# Patient Record
Sex: FEMALE | Race: ASIAN | Employment: UNEMPLOYED | URBAN - METROPOLITAN AREA
[De-identification: names, ages, dates, MRNs, and addresses within clinical notes are randomized per-mention and may not be internally consistent; named-entity substitution may affect disease eponyms.]

---

## 2017-01-10 ENCOUNTER — ROUTINE PRENATAL (OUTPATIENT)
Dept: OBGYN CLINIC | Age: 32
End: 2017-01-10

## 2017-01-10 VITALS
HEART RATE: 80 BPM | BODY MASS INDEX: 19.02 KG/M2 | WEIGHT: 104 LBS | SYSTOLIC BLOOD PRESSURE: 110 MMHG | DIASTOLIC BLOOD PRESSURE: 65 MMHG

## 2017-01-10 DIAGNOSIS — Z34.82 PRENATAL CARE, SUBSEQUENT PREGNANCY, SECOND TRIMESTER: Primary | ICD-10-CM

## 2017-01-10 PROCEDURE — 99212 OFFICE O/P EST SF 10 MIN: CPT | Performed by: OBSTETRICS & GYNECOLOGY

## 2017-02-07 ENCOUNTER — ROUTINE PRENATAL (OUTPATIENT)
Dept: OBGYN CLINIC | Age: 32
End: 2017-02-07

## 2017-02-07 VITALS
BODY MASS INDEX: 20.85 KG/M2 | DIASTOLIC BLOOD PRESSURE: 66 MMHG | HEART RATE: 82 BPM | WEIGHT: 114 LBS | SYSTOLIC BLOOD PRESSURE: 110 MMHG

## 2017-02-07 DIAGNOSIS — Z34.83 PRENATAL CARE, SUBSEQUENT PREGNANCY, THIRD TRIMESTER: Primary | ICD-10-CM

## 2017-02-07 PROCEDURE — 99212 OFFICE O/P EST SF 10 MIN: CPT | Performed by: OBSTETRICS & GYNECOLOGY

## 2017-03-01 ENCOUNTER — HOSPITAL ENCOUNTER (OUTPATIENT)
Dept: OTHER | Age: 32
Discharge: HOME | End: 2017-03-31

## 2017-03-07 ENCOUNTER — ROUTINE PRENATAL (OUTPATIENT)
Dept: OBGYN CLINIC | Age: 32
End: 2017-03-07

## 2017-03-07 VITALS
WEIGHT: 121 LBS | SYSTOLIC BLOOD PRESSURE: 116 MMHG | HEART RATE: 100 BPM | BODY MASS INDEX: 22.13 KG/M2 | DIASTOLIC BLOOD PRESSURE: 67 MMHG

## 2017-03-07 DIAGNOSIS — Z3A.32 32 WEEKS GESTATION OF PREGNANCY: Primary | ICD-10-CM

## 2017-03-07 PROCEDURE — 99213 OFFICE O/P EST LOW 20 MIN: CPT | Performed by: OBSTETRICS & GYNECOLOGY

## 2017-04-01 ENCOUNTER — HOSPITAL ENCOUNTER (OUTPATIENT)
Dept: OTHER | Age: 32
Discharge: HOME | End: 2017-04-30

## 2017-04-04 ENCOUNTER — ROUTINE PRENATAL (OUTPATIENT)
Dept: OBGYN CLINIC | Age: 32
End: 2017-04-04

## 2017-04-04 VITALS
BODY MASS INDEX: 22.68 KG/M2 | SYSTOLIC BLOOD PRESSURE: 108 MMHG | HEART RATE: 80 BPM | WEIGHT: 124 LBS | DIASTOLIC BLOOD PRESSURE: 74 MMHG

## 2017-04-04 DIAGNOSIS — Z34.83 PRENATAL CARE, SUBSEQUENT PREGNANCY, THIRD TRIMESTER: Primary | ICD-10-CM

## 2017-04-04 LAB
BASOPHILS ABSOLUTE: 0 K/UL (ref 0–0.2)
BASOPHILS RELATIVE PERCENT: 0.2 %
EOSINOPHILS ABSOLUTE: 0.2 K/UL (ref 0–0.6)
EOSINOPHILS RELATIVE PERCENT: 2.3 %
HCT VFR BLD CALC: 42.4 % (ref 36–48)
HEMOGLOBIN: 14 G/DL (ref 12–16)
LYMPHOCYTES ABSOLUTE: 1.7 K/UL (ref 1–5.1)
LYMPHOCYTES RELATIVE PERCENT: 18.1 %
MCH RBC QN AUTO: 31.2 PG (ref 26–34)
MCHC RBC AUTO-ENTMCNC: 33 G/DL (ref 31–36)
MCV RBC AUTO: 94.6 FL (ref 80–100)
MONOCYTES ABSOLUTE: 0.7 K/UL (ref 0–1.3)
MONOCYTES RELATIVE PERCENT: 7 %
NEUTROPHILS ABSOLUTE: 6.8 K/UL (ref 1.7–7.7)
NEUTROPHILS RELATIVE PERCENT: 72.4 %
PDW BLD-RTO: 14.3 % (ref 12.4–15.4)
PLATELET # BLD: 234 K/UL (ref 135–450)
PMV BLD AUTO: 7.5 FL (ref 5–10.5)
RBC # BLD: 4.48 M/UL (ref 4–5.2)
WBC # BLD: 9.4 K/UL (ref 4–11)

## 2017-04-04 PROCEDURE — 99212 OFFICE O/P EST SF 10 MIN: CPT

## 2017-04-07 LAB — GROUP B STREP CULTURE: NORMAL

## 2017-04-11 ENCOUNTER — ROUTINE PRENATAL (OUTPATIENT)
Dept: OBGYN CLINIC | Age: 32
End: 2017-04-11

## 2017-04-11 VITALS
BODY MASS INDEX: 23.05 KG/M2 | HEART RATE: 97 BPM | DIASTOLIC BLOOD PRESSURE: 73 MMHG | WEIGHT: 126 LBS | SYSTOLIC BLOOD PRESSURE: 123 MMHG

## 2017-04-11 DIAGNOSIS — Z34.83 PRENATAL CARE, SUBSEQUENT PREGNANCY, THIRD TRIMESTER: Primary | ICD-10-CM

## 2017-04-11 PROCEDURE — 99212 OFFICE O/P EST SF 10 MIN: CPT | Performed by: OBSTETRICS & GYNECOLOGY

## 2017-04-18 ENCOUNTER — ROUTINE PRENATAL (OUTPATIENT)
Dept: OBGYN CLINIC | Age: 32
End: 2017-04-18

## 2017-04-18 VITALS
BODY MASS INDEX: 23.41 KG/M2 | HEART RATE: 98 BPM | DIASTOLIC BLOOD PRESSURE: 69 MMHG | WEIGHT: 128 LBS | SYSTOLIC BLOOD PRESSURE: 107 MMHG

## 2017-04-18 DIAGNOSIS — Z34.82 PRENATAL CARE, SUBSEQUENT PREGNANCY, SECOND TRIMESTER: Primary | ICD-10-CM

## 2017-04-18 PROCEDURE — 99213 OFFICE O/P EST LOW 20 MIN: CPT | Performed by: OBSTETRICS & GYNECOLOGY

## 2017-04-22 PROBLEM — Z34.90 TERM PREGNANCY: Status: ACTIVE | Noted: 2017-04-22

## 2017-04-27 ENCOUNTER — TELEPHONE (OUTPATIENT)
Dept: OBGYN CLINIC | Age: 32
End: 2017-04-27

## 2017-05-30 ENCOUNTER — POSTPARTUM VISIT (OUTPATIENT)
Dept: OBGYN CLINIC | Age: 32
End: 2017-05-30

## 2017-05-30 VITALS
SYSTOLIC BLOOD PRESSURE: 113 MMHG | TEMPERATURE: 97.4 F | DIASTOLIC BLOOD PRESSURE: 74 MMHG | BODY MASS INDEX: 19.2 KG/M2 | HEART RATE: 50 BPM | WEIGHT: 105 LBS

## 2019-04-08 LAB — GONADOTROPIN, CHORIONIC (HCG) QUANT: 1831 MIU/ML

## 2019-04-09 LAB
ABO/RH: NORMAL
ANTIBODY SCREEN: NORMAL

## 2019-04-10 LAB — URINE CULTURE, ROUTINE: NORMAL

## 2019-04-11 LAB
HPV COMMENT: NORMAL
HPV TYPE 16: NOT DETECTED
HPV TYPE 18: NOT DETECTED
HPVOH (OTHER TYPES): NOT DETECTED

## 2019-04-17 LAB
ESTIMATED AVERAGE GLUCOSE: 102.5 MG/DL
HBA1C MFR BLD: 5.2 %
PROLACTIN: 11.5 NG/ML
TSH SERPL DL<=0.05 MIU/L-ACNC: 0.74 UIU/ML (ref 0.27–4.2)

## 2019-04-20 LAB
ANTICARDIOLIPIN IGG ANTIBODY: 0 GPL (ref 0–14)
CARDIOLIPIN AB IGM: 5 MPL (ref 0–12)
DRVVT CONFIRMATION TEST: ABNORMAL RATIO
DRVVT SCREEN: 32 SEC (ref 33–44)
DRVVT,DIL: ABNORMAL SEC (ref 33–44)
HEXAGONAL PHOSPHOLIPID NEUTRALIZAT TEST: ABNORMAL
LUPUS ANTICOAG INTERP: ABNORMAL
PLT NEUTA: ABNORMAL
PT D: 12.3 SEC (ref 12–15.5)
PTT D: 46 SEC (ref 32–48)
PTT-D CORR REFLEX: ABNORMAL SEC (ref 32–48)
PTT-HEPARIN NEUTRALIZED: ABNORMAL SEC (ref 32–48)
REPTILASE TIME: ABNORMAL SEC
THROMBIN TIME: ABNORMAL SEC (ref 14.7–19.5)

## 2019-05-17 LAB
ORGANISM: ABNORMAL
URINE CULTURE, ROUTINE: ABNORMAL
URINE CULTURE, ROUTINE: ABNORMAL

## 2021-08-24 LAB
ABO/RH: NORMAL
ANTIBODY SCREEN: NORMAL
HEPATITIS C ANTIBODY INTERPRETATION: NORMAL

## 2021-08-25 LAB
BASOPHILS ABSOLUTE: 0 K/UL (ref 0–0.2)
BASOPHILS RELATIVE PERCENT: 0.6 %
EOSINOPHILS ABSOLUTE: 0.3 K/UL (ref 0–0.6)
EOSINOPHILS RELATIVE PERCENT: 3 %
HCT VFR BLD CALC: 41.5 % (ref 36–48)
HEMOGLOBIN: 14 G/DL (ref 12–16)
HEPATITIS B SURFACE ANTIGEN INTERPRETATION: NORMAL
HIV AG/AB: NORMAL
HIV ANTIGEN: NORMAL
HIV-1 ANTIBODY: NORMAL
HIV-2 AB: NORMAL
LYMPHOCYTES ABSOLUTE: 1.5 K/UL (ref 1–5.1)
LYMPHOCYTES RELATIVE PERCENT: 17.1 %
MCH RBC QN AUTO: 30 PG (ref 26–34)
MCHC RBC AUTO-ENTMCNC: 33.6 G/DL (ref 31–36)
MCV RBC AUTO: 89.4 FL (ref 80–100)
MONOCYTES ABSOLUTE: 0.4 K/UL (ref 0–1.3)
MONOCYTES RELATIVE PERCENT: 5.2 %
NEUTROPHILS ABSOLUTE: 6.4 K/UL (ref 1.7–7.7)
NEUTROPHILS RELATIVE PERCENT: 74.1 %
PDW BLD-RTO: 13.4 % (ref 12.4–15.4)
PLATELET # BLD: 246 K/UL (ref 135–450)
PMV BLD AUTO: 7.8 FL (ref 5–10.5)
RBC # BLD: 4.64 M/UL (ref 4–5.2)
RUBELLA ANTIBODY IGG: 39.6 IU/ML
TOTAL SYPHILLIS IGG/IGM: NORMAL
URINE CULTURE, ROUTINE: NORMAL
WBC # BLD: 8.7 K/UL (ref 4–11)

## 2021-12-14 LAB
GLUCOSE CHALLENGE: 65 MG/DL
HCT VFR BLD CALC: 36.3 % (ref 36–48)
HEMOGLOBIN: 12.1 G/DL (ref 12–16)
MCH RBC QN AUTO: 29.9 PG (ref 26–34)
MCHC RBC AUTO-ENTMCNC: 33.4 G/DL (ref 31–36)
MCV RBC AUTO: 89.6 FL (ref 80–100)
PDW BLD-RTO: 13.5 % (ref 12.4–15.4)
PLATELET # BLD: 616 K/UL (ref 135–450)
PMV BLD AUTO: 6.4 FL (ref 5–10.5)
RBC # BLD: 4.05 M/UL (ref 4–5.2)
WBC # BLD: 9.9 K/UL (ref 4–11)

## 2022-01-10 LAB
HCT VFR BLD CALC: 36.7 % (ref 34–49)
HEMOGLOBIN: 12.1 G/DL (ref 11.2–15.7)
MCH RBC QN AUTO: 28.7 PG (ref 26–34)
MCHC RBC AUTO-ENTMCNC: 33 G/DL (ref 30.7–35.5)
MCV RBC AUTO: 87 FL (ref 80–100)
PDW BLD-RTO: 12.2 %
PLATELET # BLD: 288 K/UL (ref 140–400)
PMV BLD AUTO: 9.2 FL (ref 7.2–11.7)
RBC # BLD: 4.22 M/UL (ref 3.95–5.26)
WBC: 9.7 K/UL (ref 3.5–10.9)

## 2022-02-28 LAB — GBS, EXTERNAL RESULT: NORMAL

## 2022-03-02 LAB
GROUP B STREP SCREEN PCR: NOT DETECTED
SOURCE: NORMAL

## 2022-03-23 ENCOUNTER — HOSPITAL ENCOUNTER (INPATIENT)
Age: 37
LOS: 1 days | Discharge: HOME | DRG: 560 | End: 2022-03-24
Attending: OBSTETRICS & GYNECOLOGY | Admitting: OBSTETRICS & GYNECOLOGY
Payer: MEDICAID

## 2022-03-23 PROBLEM — Z37.9 NORMAL LABOR: Status: ACTIVE | Noted: 2022-03-23

## 2022-03-23 LAB
ABO/RH: NORMAL
ABO/RH: NORMAL
AMPHETAMINE SCREEN, URINE: NORMAL
ANTIBODY SCREEN: NORMAL
BARBITURATE SCREEN URINE: NORMAL
BENZODIAZEPINE SCREEN, URINE: NORMAL
BUPRENORPHINE URINE: NORMAL
CANNABINOID SCREEN URINE: NORMAL
COCAINE METABOLITE SCREEN URINE: NORMAL
HCT VFR BLD CALC: 38.9 % (ref 36–48)
HEMOGLOBIN: 12.3 G/DL (ref 12–16)
Lab: NORMAL
MCH RBC QN AUTO: 27.1 PG (ref 26–34)
MCHC RBC AUTO-ENTMCNC: 31.7 G/DL (ref 31–36)
MCV RBC AUTO: 85.4 FL (ref 80–100)
METHADONE SCREEN, URINE: NORMAL
OPIATE SCREEN URINE: NORMAL
OXYCODONE URINE: NORMAL
PDW BLD-RTO: 14.7 % (ref 12.4–15.4)
PH UA: 5
PHENCYCLIDINE SCREEN URINE: NORMAL
PLATELET # BLD: 258 K/UL (ref 135–450)
PMV BLD AUTO: 7.1 FL (ref 5–10.5)
PROPOXYPHENE SCREEN: NORMAL
RBC # BLD: 4.55 M/UL (ref 4–5.2)
SARS-COV-2, NAAT: NOT DETECTED
WBC # BLD: 7.9 K/UL (ref 4–11)

## 2022-03-23 PROCEDURE — 86850 RBC ANTIBODY SCREEN: CPT

## 2022-03-23 PROCEDURE — 80307 DRUG TEST PRSMV CHEM ANLYZR: CPT

## 2022-03-23 PROCEDURE — 85027 COMPLETE CBC AUTOMATED: CPT

## 2022-03-23 PROCEDURE — 86780 TREPONEMA PALLIDUM: CPT

## 2022-03-23 PROCEDURE — 36415 COLL VENOUS BLD VENIPUNCTURE: CPT

## 2022-03-23 PROCEDURE — 87635 SARS-COV-2 COVID-19 AMP PRB: CPT

## 2022-03-23 PROCEDURE — 86901 BLOOD TYPING SEROLOGIC RH(D): CPT

## 2022-03-23 PROCEDURE — 6360000002 HC RX W HCPCS: Performed by: OBSTETRICS & GYNECOLOGY

## 2022-03-23 PROCEDURE — 7200000001 HC VAGINAL DELIVERY

## 2022-03-23 PROCEDURE — 10907ZC DRAINAGE OF AMNIOTIC FLUID, THERAPEUTIC FROM PRODUCTS OF CONCEPTION, VIA NATURAL OR ARTIFICIAL OPENING: ICD-10-PCS | Performed by: OBSTETRICS & GYNECOLOGY

## 2022-03-23 PROCEDURE — 0KQM0ZZ REPAIR PERINEUM MUSCLE, OPEN APPROACH: ICD-10-PCS | Performed by: OBSTETRICS & GYNECOLOGY

## 2022-03-23 PROCEDURE — 1220000000 HC SEMI PRIVATE OB R&B

## 2022-03-23 PROCEDURE — 2580000003 HC RX 258: Performed by: OBSTETRICS & GYNECOLOGY

## 2022-03-23 PROCEDURE — 6370000000 HC RX 637 (ALT 250 FOR IP): Performed by: OBSTETRICS & GYNECOLOGY

## 2022-03-23 PROCEDURE — 86900 BLOOD TYPING SEROLOGIC ABO: CPT

## 2022-03-23 RX ORDER — METHYLERGONOVINE MALEATE 0.2 MG/ML
200 INJECTION INTRAVENOUS PRN
Status: DISCONTINUED | OUTPATIENT
Start: 2022-03-23 | End: 2022-03-23

## 2022-03-23 RX ORDER — MODIFIED LANOLIN
OINTMENT (GRAM) TOPICAL PRN
Status: DISCONTINUED | OUTPATIENT
Start: 2022-03-23 | End: 2022-03-24 | Stop reason: HOSPADM

## 2022-03-23 RX ORDER — SIMETHICONE 80 MG
80 TABLET,CHEWABLE ORAL EVERY 6 HOURS PRN
Status: DISCONTINUED | OUTPATIENT
Start: 2022-03-23 | End: 2022-03-24 | Stop reason: HOSPADM

## 2022-03-23 RX ORDER — FERROUS SULFATE 325(65) MG
325 TABLET ORAL 2 TIMES DAILY WITH MEALS
Status: DISCONTINUED | OUTPATIENT
Start: 2022-03-23 | End: 2022-03-24 | Stop reason: HOSPADM

## 2022-03-23 RX ORDER — ACETAMINOPHEN 325 MG/1
650 TABLET ORAL EVERY 4 HOURS PRN
Status: DISCONTINUED | OUTPATIENT
Start: 2022-03-23 | End: 2022-03-23

## 2022-03-23 RX ORDER — OXYCODONE HYDROCHLORIDE 5 MG/1
5 TABLET ORAL EVERY 6 HOURS PRN
Status: DISCONTINUED | OUTPATIENT
Start: 2022-03-23 | End: 2022-03-24 | Stop reason: HOSPADM

## 2022-03-23 RX ORDER — SODIUM CHLORIDE 9 MG/ML
25 INJECTION, SOLUTION INTRAVENOUS PRN
Status: DISCONTINUED | OUTPATIENT
Start: 2022-03-23 | End: 2022-03-23

## 2022-03-23 RX ORDER — IBUPROFEN 800 MG/1
800 TABLET ORAL EVERY 6 HOURS PRN
Status: DISCONTINUED | OUTPATIENT
Start: 2022-03-23 | End: 2022-03-24 | Stop reason: HOSPADM

## 2022-03-23 RX ORDER — SODIUM CHLORIDE 9 MG/ML
25 INJECTION, SOLUTION INTRAVENOUS PRN
Status: DISCONTINUED | OUTPATIENT
Start: 2022-03-23 | End: 2022-03-24 | Stop reason: HOSPADM

## 2022-03-23 RX ORDER — SODIUM CHLORIDE, SODIUM LACTATE, POTASSIUM CHLORIDE, CALCIUM CHLORIDE 600; 310; 30; 20 MG/100ML; MG/100ML; MG/100ML; MG/100ML
1000 INJECTION, SOLUTION INTRAVENOUS PRN
Status: DISCONTINUED | OUTPATIENT
Start: 2022-03-23 | End: 2022-03-23

## 2022-03-23 RX ORDER — MISOPROSTOL 100 UG/1
800 TABLET ORAL PRN
Status: DISCONTINUED | OUTPATIENT
Start: 2022-03-23 | End: 2022-03-23

## 2022-03-23 RX ORDER — SODIUM CHLORIDE 0.9 % (FLUSH) 0.9 %
10 SYRINGE (ML) INJECTION EVERY 12 HOURS SCHEDULED
Status: DISCONTINUED | OUTPATIENT
Start: 2022-03-23 | End: 2022-03-24 | Stop reason: HOSPADM

## 2022-03-23 RX ORDER — CARBOPROST TROMETHAMINE 250 UG/ML
250 INJECTION, SOLUTION INTRAMUSCULAR PRN
Status: DISCONTINUED | OUTPATIENT
Start: 2022-03-23 | End: 2022-03-23

## 2022-03-23 RX ORDER — ONDANSETRON 2 MG/ML
4 INJECTION INTRAMUSCULAR; INTRAVENOUS EVERY 6 HOURS PRN
Status: DISCONTINUED | OUTPATIENT
Start: 2022-03-23 | End: 2022-03-24 | Stop reason: HOSPADM

## 2022-03-23 RX ORDER — SODIUM CHLORIDE 0.9 % (FLUSH) 0.9 %
10 SYRINGE (ML) INJECTION PRN
Status: DISCONTINUED | OUTPATIENT
Start: 2022-03-23 | End: 2022-03-24 | Stop reason: HOSPADM

## 2022-03-23 RX ORDER — DOCUSATE SODIUM 100 MG/1
100 CAPSULE, LIQUID FILLED ORAL 2 TIMES DAILY
Status: DISCONTINUED | OUTPATIENT
Start: 2022-03-23 | End: 2022-03-24 | Stop reason: HOSPADM

## 2022-03-23 RX ORDER — SODIUM CHLORIDE, SODIUM LACTATE, POTASSIUM CHLORIDE, CALCIUM CHLORIDE 600; 310; 30; 20 MG/100ML; MG/100ML; MG/100ML; MG/100ML
500 INJECTION, SOLUTION INTRAVENOUS PRN
Status: DISCONTINUED | OUTPATIENT
Start: 2022-03-23 | End: 2022-03-23

## 2022-03-23 RX ORDER — ACETAMINOPHEN 500 MG
500 TABLET ORAL EVERY 6 HOURS PRN
Qty: 120 TABLET | Refills: 0 | Status: SHIPPED | OUTPATIENT
Start: 2022-03-23 | End: 2022-03-24 | Stop reason: SDUPTHER

## 2022-03-23 RX ORDER — ONDANSETRON 2 MG/ML
4 INJECTION INTRAMUSCULAR; INTRAVENOUS EVERY 6 HOURS PRN
Status: DISCONTINUED | OUTPATIENT
Start: 2022-03-23 | End: 2022-03-23

## 2022-03-23 RX ORDER — IBUPROFEN 600 MG/1
600 TABLET ORAL EVERY 6 HOURS PRN
Qty: 120 TABLET | Refills: 0 | Status: SHIPPED | OUTPATIENT
Start: 2022-03-23 | End: 2022-03-24 | Stop reason: SDUPTHER

## 2022-03-23 RX ORDER — LIDOCAINE HYDROCHLORIDE 10 MG/ML
INJECTION, SOLUTION EPIDURAL; INFILTRATION; INTRACAUDAL; PERINEURAL
Status: DISCONTINUED
Start: 2022-03-23 | End: 2022-03-23

## 2022-03-23 RX ORDER — SODIUM CHLORIDE 0.9 % (FLUSH) 0.9 %
5-40 SYRINGE (ML) INJECTION PRN
Status: DISCONTINUED | OUTPATIENT
Start: 2022-03-23 | End: 2022-03-23

## 2022-03-23 RX ORDER — SODIUM CHLORIDE, SODIUM LACTATE, POTASSIUM CHLORIDE, CALCIUM CHLORIDE 600; 310; 30; 20 MG/100ML; MG/100ML; MG/100ML; MG/100ML
INJECTION, SOLUTION INTRAVENOUS CONTINUOUS
Status: DISCONTINUED | OUTPATIENT
Start: 2022-03-23 | End: 2022-03-23

## 2022-03-23 RX ORDER — ACETAMINOPHEN 325 MG/1
650 TABLET ORAL EVERY 4 HOURS PRN
Status: DISCONTINUED | OUTPATIENT
Start: 2022-03-23 | End: 2022-03-24 | Stop reason: HOSPADM

## 2022-03-23 RX ADMIN — SODIUM CHLORIDE, POTASSIUM CHLORIDE, SODIUM LACTATE AND CALCIUM CHLORIDE: 600; 310; 30; 20 INJECTION, SOLUTION INTRAVENOUS at 16:00

## 2022-03-23 RX ADMIN — DOCUSATE SODIUM 100 MG: 100 CAPSULE, LIQUID FILLED ORAL at 19:33

## 2022-03-23 RX ADMIN — Medication 87.3 MILLI-UNITS/MIN: at 18:04

## 2022-03-23 RX ADMIN — IBUPROFEN 800 MG: 800 TABLET, FILM COATED ORAL at 19:33

## 2022-03-23 ASSESSMENT — PAIN SCALES - GENERAL: PAINLEVEL_OUTOF10: 3

## 2022-03-23 NOTE — H&P
Department of Obstetrics and Gynecology   Obstetrics History and Physical        CHIEF COMPLAINT:  contractions    HISTORY OF PRESENT ILLNESS:      The patient is a 31 y.o. female at 38w11d. OB History        3    Para   1    Term   1       0    AB   1    Living   1       SAB   1    IAB   0    Ectopic   0    Molar        Multiple   0    Live Births   1            Patient presents with a chief complaint as above and is being admitted for active phase labor    Estimated Due Date: Estimated Date of Delivery: 3/25/22    PRENATAL CARE:    Complicated by: none    PAST OB HISTORY:  OB History        3    Para   1    Term   1       0    AB   1    Living   1       SAB   1    IAB   0    Ectopic   0    Molar        Multiple   0    Live Births   1                Past Medical History:    History reviewed. No pertinent past medical history. Past Surgical History:    History reviewed. No pertinent surgical history. Allergies:  Seasonal and Shellfish-derived products    Social History:    Social History     Socioeconomic History    Marital status:      Spouse name: Not on file    Number of children: Not on file    Years of education: Not on file    Highest education level: Not on file   Occupational History    Not on file   Tobacco Use    Smoking status: Never Smoker    Smokeless tobacco: Never Used   Substance and Sexual Activity    Alcohol use: No    Drug use: No    Sexual activity: Not on file   Other Topics Concern    Not on file   Social History Narrative    Not on file     Social Determinants of Health     Financial Resource Strain:     Difficulty of Paying Living Expenses: Not on file   Food Insecurity:     Worried About Running Out of Food in the Last Year: Not on file    Marlen of Food in the Last Year: Not on file   Transportation Needs:     Lack of Transportation (Medical): Not on file    Lack of Transportation (Non-Medical):  Not on file   Physical Activity:     Days of Exercise per Week: Not on file    Minutes of Exercise per Session: Not on file   Stress:     Feeling of Stress : Not on file   Social Connections:     Frequency of Communication with Friends and Family: Not on file    Frequency of Social Gatherings with Friends and Family: Not on file    Attends [employer] Services: Not on file    Active Member of [address] [place] [address] or Organizations: Not on file    Attends Club or Organization Meetings: Not on file    Marital Status: Not on file   Intimate Partner Violence:     Fear of Current or Ex-Partner: Not on file    Emotionally Abused: Not on file    Physically Abused: Not on file    Sexually Abused: Not on file   Housing Stability:     Unable to Pay for Housing in the Last Year: Not on file    Number of [place] in the Last Year: Not on file    Unstable Housing in the Last Year: Not on file     Family History:   History reviewed. No pertinent family history. Medications Prior to Admission:  Medications Prior to Admission: [DISCONTINUED] ibuprofen (ADVIL;MOTRIN) 800 MG tablet, Take 1 tablet by mouth every 8 hours as needed for Pain  [DISCONTINUED] docusate sodium (COLACE) 100 MG capsule, Take 1 capsule by mouth 2 times daily  Prenatal Vit-Fe Fumarate-FA (PRENATAL VITAMIN PO), Take by mouth    REVIEW OF SYSTEMS:        PHYSICAL EXAM:  Vitals:    03/23/22 1216 03/23/22 1544   BP: 109/69 112/68   Pulse: 93 72   Resp: 18 16   Temp: 98.2 °F (36.8 °C)    TempSrc: Oral      General appearance:  awake, alert, cooperative, no apparent distress, and appears stated age  Neurologic:  Awake, alert, oriented to name, place and time. Lungs:  No increased work of breathing, good air exchange  Abdomen:  Soft, non tender, gravid, consistent with her gestational age, EFW by Leopold's maneuver was 3500 gms   Fetal heart rate:  Reassuring.   Pelvis:  Adequate pelvis  Cervix: 6/80  Contraction frequency:    Membranes:  Intact    Labs:   CBC:   Lab Results   Component Value Date WBC 7.9 03/23/2022    RBC 4.55 03/23/2022    HGB 12.3 03/23/2022    HCT 38.9 03/23/2022    MCV 85.4 03/23/2022    MCH 27.1 03/23/2022    MCHC 31.7 03/23/2022    RDW 14.7 03/23/2022     03/23/2022    MPV 7.1 03/23/2022       ASSESSMENT AND PLAN:    Labor: Admit, anticipate normal delivery, routine labor orders  Fetus: Reassuring  GBS: No  Other: desires NCB

## 2022-03-23 NOTE — DISCHARGE SUMMARY
Obstetrical Discharge Form    Gestational Age: 38w11d    Antepartum complications: none    Date of Delivery: 3/23/22      Type of Delivery: vaginal, spontaneous    Delivered By: Albert Song     Baby:      Information for the patient's :  Meche Sheth [[id number]]   APGAR One: 6     Information for the patient's :  Meche Sheth [[id number]]   APGAR Five: 9     Information for the patient's :  Meche Sheth [[id number]]   Birth Weight: 8 lb 7.3 oz (3.835 kg)       Anesthesia: Epidural    Intrapartum complications: None    Postpartum complications: none    Discharge Medication:      Medication List      START taking these medications    acetaminophen 500 MG tablet  Commonly known as: TYLENOL  Take 1 tablet by mouth every 6 hours as needed for Pain     ibuprofen 600 MG tablet  Commonly known as: ADVIL;MOTRIN  Take 1 tablet by mouth every 6 hours as needed for Pain        CONTINUE taking these medications    PRENATAL VITAMIN PO           Where to Get Your Medications      You can get these medications from any pharmacy    Bring a paper prescription for each of these medications  · acetaminophen 500 MG tablet  · ibuprofen 600 MG tablet         Discharge Condition:  good    Discharge Date: 3/24/22    PLAN:  Follow up in 6 weeks for routine PP visit  All questions answered  D/C summary begun at delivery for D/C planning purposes, any delay in discharge from ordered D/C date due to  factors.   \

## 2022-03-23 NOTE — PROCEDURES
Department of Obstetrics and Gynecology  Spontaneous Vaginal Delivery Note    Labor & Delivery Summary  Dilation Complete Date: 22  Dilation Complete Time: 1751    Pre-operative Diagnosis:  Term pregnancy, Spontaneous labor, Single fetus and Uncomplicated pregnancy    Post-operative Diagnosis:  Living  infant(s) and Male    Procedure:  Spontaneous vaginal delivery    Surgeon:  Rodolfo WAITE Dr, [address] & Delivery Summary  Dilation Complete Date: 22  Dilation Complete Time: 7934  Information for the patient's :  Sophia Sheth [[id number]]   APGAR One: 6     Information for the patient's :  Vladimir Espinal [[id number]]   APGAR Five: 9     Information for the patient's :  Vladimir Espinal [[id number]]   Birth Weight: 8 lb 7.3 oz (3.835 kg)       Anesthesia:  epidural anesthesia    Estimated blood loss:  200 ml    Specimen:  Placenta not sent to pathology     Cord blood sent No    Complications:  none    Condition:  infant stable to general nursery and mother stable    Details of Procedure: The patient is a 31 y.o. female at 38w11d   OB History        3    Para   1    Term   1       0    AB   1    Living   1       SAB   1    IAB   0    Ectopic   0    Molar        Multiple   0    Live Births   1             who was admitted for active phase labor. She received the following interventions: ARBOW. The patient progressed normally,did receive an epidural, became complete and started to push. After pushing for 2 contractions the infant was delivered  atraumatically and  placed on the maternal abdomen. The cord was clamped and cut after 60 delay and infant was handed off to the waiting nurse for evaluation. The delivery of the placenta was spontaneous. The uterus contracted well with fundal massage and oxytocin infusion. The perineum and vagina were explored and 2nd degree laceration was noted.  It was repaired in standard fashion using 3-0 vicryl.

## 2022-03-24 VITALS
DIASTOLIC BLOOD PRESSURE: 67 MMHG | SYSTOLIC BLOOD PRESSURE: 98 MMHG | RESPIRATION RATE: 14 BRPM | TEMPERATURE: 97.3 F | HEART RATE: 78 BPM

## 2022-03-24 LAB
BASOPHILS ABSOLUTE: 0.1 K/UL (ref 0–0.2)
BASOPHILS RELATIVE PERCENT: 0.5 %
EOSINOPHILS ABSOLUTE: 0.2 K/UL (ref 0–0.6)
EOSINOPHILS RELATIVE PERCENT: 1.3 %
HCT VFR BLD CALC: 37.4 % (ref 36–48)
HEMOGLOBIN: 12.1 G/DL (ref 12–16)
LYMPHOCYTES ABSOLUTE: 1.9 K/UL (ref 1–5.1)
LYMPHOCYTES RELATIVE PERCENT: 15.9 %
MCH RBC QN AUTO: 27.6 PG (ref 26–34)
MCHC RBC AUTO-ENTMCNC: 32.2 G/DL (ref 31–36)
MCV RBC AUTO: 85.5 FL (ref 80–100)
MONOCYTES ABSOLUTE: 0.7 K/UL (ref 0–1.3)
MONOCYTES RELATIVE PERCENT: 5.9 %
NEUTROPHILS ABSOLUTE: 9.2 K/UL (ref 1.7–7.7)
NEUTROPHILS RELATIVE PERCENT: 76.4 %
PDW BLD-RTO: 14.6 % (ref 12.4–15.4)
PLATELET # BLD: 226 K/UL (ref 135–450)
PMV BLD AUTO: 7.7 FL (ref 5–10.5)
RBC # BLD: 4.37 M/UL (ref 4–5.2)
TOTAL SYPHILLIS IGG/IGM: NORMAL
WBC # BLD: 12.1 K/UL (ref 4–11)

## 2022-03-24 PROCEDURE — 6370000000 HC RX 637 (ALT 250 FOR IP): Performed by: OBSTETRICS & GYNECOLOGY

## 2022-03-24 PROCEDURE — 85025 COMPLETE CBC W/AUTO DIFF WBC: CPT

## 2022-03-24 RX ORDER — IBUPROFEN 600 MG/1
600 TABLET ORAL EVERY 6 HOURS PRN
Qty: 120 TABLET | Refills: 0 | Status: SHIPPED | OUTPATIENT
Start: 2022-03-24

## 2022-03-24 RX ORDER — ACETAMINOPHEN 500 MG
500 TABLET ORAL EVERY 6 HOURS PRN
Qty: 120 TABLET | Refills: 0 | Status: SHIPPED | OUTPATIENT
Start: 2022-03-24

## 2022-03-24 RX ADMIN — IBUPROFEN 800 MG: 800 TABLET, FILM COATED ORAL at 06:36

## 2022-03-24 RX ADMIN — IBUPROFEN 800 MG: 800 TABLET, FILM COATED ORAL at 12:46

## 2022-03-24 RX ADMIN — DOCUSATE SODIUM 100 MG: 100 CAPSULE, LIQUID FILLED ORAL at 12:46

## 2022-03-24 RX ADMIN — ACETAMINOPHEN 650 MG: 325 TABLET ORAL at 00:12

## 2022-03-24 ASSESSMENT — PAIN SCALES - GENERAL
PAINLEVEL_OUTOF10: 3
PAINLEVEL_OUTOF10: 2
PAINLEVEL_OUTOF10: 3

## 2022-03-24 NOTE — FLOWSHEET NOTE

## 2022-03-24 NOTE — PLAN OF CARE
Problem: VAGINAL DELIVERY - RECOVERY AND POST PARTUM  Goal: Vital signs are medically acceptable  Outcome: Met This Shift  Goal: Patient will remain free of falls  Outcome: Met This Shift  Goal: Fundus firm at midline  Outcome: Met This Shift  Goal: Moderate rubra without clots, no purulent discharge, no foul smelling lochia  Outcome: Met This Shift  Goal: Empties bladder  Outcome: Met This Shift  Goal: Verbalizes understanding of normal bowel function resumption  Outcome: Met This Shift  Goal: Edema will be absent or minimal  Outcome: Met This Shift  Goal: Breasts are soft with nipple integrity intact  Outcome: Met This Shift  Goal: Demonstrates appropriate breast feeding techniques  Outcome: Met This Shift  Goal: Appropriate behavior observed  Outcome: Met This Shift  Goal: Positive Mother-Baby interactions are observed  Outcome: Met This Shift  Goal: Perineum intact without discharge or hematoma  Outcome: Met This Shift  Goal: Ambulates independently  Outcome: Met This Shift     Problem: PAIN  Goal: Patient's pain/discomfort is manageable  Outcome: Met This Shift     Problem: KNOWLEDGE DEFICIT  Goal: Patient/S.O. demonstrates understanding of disease process, treatment plan, medications, and discharge instructions. Outcome: Met This Shift   Pt stable, anticipating discharge home if  passes testing.

## 2022-03-24 NOTE — FLOWSHEET NOTE
Discharge prescriptions given to pt for Tylenol and Motrin with instructions on use and side effects. See AVS.  Pt verbalized understanding of medications.